# Patient Record
Sex: FEMALE | Race: WHITE
[De-identification: names, ages, dates, MRNs, and addresses within clinical notes are randomized per-mention and may not be internally consistent; named-entity substitution may affect disease eponyms.]

---

## 2017-08-08 ENCOUNTER — HOSPITAL ENCOUNTER (OUTPATIENT)
Dept: HOSPITAL 76 - DI.N | Age: 42
Discharge: HOME | End: 2017-08-08
Attending: PHYSICIAN ASSISTANT
Payer: COMMERCIAL

## 2017-08-08 DIAGNOSIS — Z12.31: Primary | ICD-10-CM

## 2017-08-08 PROCEDURE — 77067 SCR MAMMO BI INCL CAD: CPT

## 2017-08-11 NOTE — MAMMOGRAPHY REPORT
DIGITAL SCREENING MAMMOGRAM:  08/08/2017

 

CLINICAL INDICATION:  A 41-year-old for baseline.

 

TECHNIQUE:  Routine CC and MLO projections were obtained of the breasts as well as bilateral laterall
y exaggerated craniocaudal views.  

 

FINDINGS:  The breasts demonstrate heterogeneously dense fibroglandular parenchyma bilaterally.  Punc
marquez, typically benign calcifications are present.  No suspicious masses, clustered microcalcificatio
ns, or regions of architectural distortion are identified.  

 

IMPRESSION:  BENIGN FINDINGS.  

 

RECOMMENDATION:  Routine annual screening unless otherwise clinically indicated. 

 

BI-RADS category 2, benign findings. 

 

STANDARD QUALIFYING STATEMENTS

 

1. This examination was reviewed with the aid of Computer-Aided Detection (CAD).

 

2. A negative or benign imaging report should not delay biopsy if clinically suspicious findings are 
present. Consider surgical consultation if warranted. More than 5% of cancers are not identified by i
maging.

 

3. Dense breasts may obscure an underlying neoplasm.

 

 

 

DD:08/11/2017 14:56:08  DT: 08/11/2017 15:43  JOB #: [id number]  EXT JOB #:[id number]

## 2017-08-30 ENCOUNTER — HOSPITAL ENCOUNTER (OUTPATIENT)
Dept: HOSPITAL 76 - LAB.WCP | Age: 42
Discharge: HOME | End: 2017-08-30
Attending: PHYSICIAN ASSISTANT
Payer: COMMERCIAL

## 2017-08-30 DIAGNOSIS — R74.8: Primary | ICD-10-CM

## 2017-08-30 LAB
ALBUMIN/GLOB SERPL: 1.1 {RATIO} (ref 1–2.2)
ANION GAP SERPL CALCULATED.4IONS-SCNC: 6 MMOL/L (ref 6–13)
BILIRUB BLD-MCNC: 0.9 MG/DL (ref 0.2–1)
BUN SERPL-MCNC: 13 MG/DL (ref 6–20)
CALCIUM UR-MCNC: 9.1 MG/DL (ref 8.5–10.3)
CHLORIDE SERPL-SCNC: 108 MMOL/L (ref 101–111)
CO2 SERPL-SCNC: 23 MMOL/L (ref 21–32)
CREAT SERPLBLD-SCNC: 0.9 MG/DL (ref 0.4–1)
GFRSERPLBLD MDRD-ARVRAT: 69 ML/MIN/{1.73_M2} (ref 89–?)
GLOBULIN SER-MCNC: 3.6 G/DL (ref 2.1–4.2)
GLUCOSE SERPL-MCNC: 100 MG/DL (ref 70–100)
POTASSIUM SERPL-SCNC: 5.1 MMOL/L (ref 3.5–5)
PROT SPEC-MCNC: 7.7 G/DL (ref 6.7–8.2)
SODIUM SERPLBLD-SCNC: 137 MMOL/L (ref 135–145)

## 2017-08-30 PROCEDURE — 36415 COLL VENOUS BLD VENIPUNCTURE: CPT

## 2017-08-30 PROCEDURE — 80053 COMPREHEN METABOLIC PANEL: CPT

## 2017-09-02 ENCOUNTER — HOSPITAL ENCOUNTER (OUTPATIENT)
Dept: HOSPITAL 76 - DI | Age: 42
Discharge: HOME | End: 2017-09-02
Attending: PHYSICIAN ASSISTANT
Payer: COMMERCIAL

## 2017-09-02 DIAGNOSIS — R74.8: Primary | ICD-10-CM

## 2017-09-02 DIAGNOSIS — R16.0: ICD-10-CM

## 2017-09-02 PROCEDURE — 76705 ECHO EXAM OF ABDOMEN: CPT

## 2017-09-02 NOTE — ULTRASOUND REPORT
EXAM:

ABDOMEN ULTRASOUND LIMITED, RUQ

 

EXAM DATE: 9/2/2017 10:13 AM.

 

CLINICAL HISTORY: ELEVATED LIVER ENZYMES.

 

COMPARISON: None.

 

TECHNIQUE: Real-time scanning was performed with static images obtained. 

 

FINDINGS: 

Liver: Normal in size and echotexture. Mildly enlarged, right lobe length 19 cm, previously at least 
17 cm. Main portal vein flow: Hepatopetal.

 

Gallbladder: Normal. No stones, wall thickening, or sonographic Webb's sign. 

 

Biliary System: CBD measures 3 mm. No intrahepatic or extrahepatic ductal dilatation. 

 

Other: Right kidney and visualized pancreas, IVC and proximal aorta are unremarkable. No free fluid d
emonstrated. 

 

IMPRESSION: 

1. No acute abnormality. 

2. Mildly enlarged liver.

 

[facility]

Referring Provider Line: [phone]

 

SITE ID: [id number]

## 2019-08-25 ENCOUNTER — HOSPITAL ENCOUNTER (EMERGENCY)
Dept: HOSPITAL 76 - ED | Age: 44
Discharge: HOME | End: 2019-08-25
Payer: COMMERCIAL

## 2019-08-25 VITALS — SYSTOLIC BLOOD PRESSURE: 119 MMHG | DIASTOLIC BLOOD PRESSURE: 78 MMHG

## 2019-08-25 DIAGNOSIS — S61.210A: Primary | ICD-10-CM
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
## 2019-08-25 PROCEDURE — 99283 EMERGENCY DEPT VISIT LOW MDM: CPT

## 2019-08-25 PROCEDURE — 12001 RPR S/N/AX/GEN/TRNK 2.5CM/<: CPT

## 2019-08-25 NOTE — ED PHYSICIAN DOCUMENTATION
PD HPI UPPER EXT INJURY





- Stated complaint


Stated Complaint: FINGER LAC





- Chief complaint


Chief Complaint: Laceration





- History obtained from


History obtained from: Patient





- History of Present Illness


Location: Right, Finger (index)


Type of injury: Laceration


Where injury occurred: Work


Timing - onset: Today


Timing - duration: Minutes


Timing - details: Abrupt onset, Still present


Improved by: Rest, Immobilization


Worsened by: Moving, Palpating


Associated symptoms: No: Weakness, Numbness, Tingling, Swelling


Contributing factors: No: Anticoagulated


Similar symptoms before: Diagnosis (Laceration)


Recently seen: Not recently seen





- Additonal information


Additional information: 





44-year-old previously well female was cleaning a brand-new knife when she 

lacerated the top of her right index finger.





Review of Systems


Constitutional: denies: Fever


Eyes: denies: Decreased vision


Ears: denies: Ear pain


Nose: denies: Congestion


Throat: denies: Sore throat


Respiratory: denies: Dyspnea, Cough


GI: denies: Vomiting


: denies: Dysuria


Skin: reports: Laceration (s)





PD PAST MEDICAL HISTORY





- Past Surgical History


Past Surgical History: Yes


/GYN: Tubal ligation


HEENT: Tonsil/Adenoidectomy





- Present Medications


Home Medications: 


                                Ambulatory Orders











 Medication  Instructions  Recorded  Confirmed


 


No Known Home Medications  11/16/14 11/16/14














- Allergies


Allergies/Adverse Reactions: 


                                    Allergies











Allergy/AdvReac Type Severity Reaction Status Date / Time


 


codeine Allergy  Emesis Verified 11/16/14 13:02














- Social History


Does the pt smoke?: No


Smoking Status: Never smoker


Does the pt drink ETOH?: Yes


Does the pt have substance abuse?: No





- Immunizations


Immunizations are current?: No


Immunizations: TDAP >10years/unknown





PD ED PE NORMAL





- Vitals


Vital signs reviewed: Yes (Normal)





- General


General: Alert and oriented X 3, No acute distress, Well developed/nourished





- HEENT


HEENT: Atraumatic, PERRL





- Respiratory


Respiratory: No respiratory distress





- Derm


Derm: Normal color, Warm and dry, No rash





- Extremities


Extremities: No deformity, Normal ROM s pain, No edema, Other (There is a 2.5 cm

laceration over the dorsal surface of the right index finger to the radial side 

of the finger.  This is over the DIP and there is no involvement of the nail or 

the nail bed and the distal neurovascular components are intact.  She has full 

function of the finger.)





- Neuro


Neuro: Alert and oriented X 3, CNs 2-12 intact, No motor deficit, No sensory 

deficit, Normal speech


Eye Opening: Spontaneous


Motor: Obeys Commands


Verbal: Oriented


GCS Score: 15





- Psych


Psych: Normal mood, Normal affect





Results





- Vitals


Vitals: 





                               Vital Signs - 24 hr











  08/25/19





  21:51


 


Temperature 36.6 C


 


Heart Rate 65


 


Respiratory 14





Rate 


 


Blood Pressure 125/68


 


O2 Saturation 100








                                     Oxygen











O2 Source                      Room air

















Procedures





- Laceration (location)


  ** Right index


Length in cm: 2.5


Wound type: Linear, Clean


Neurovascular status: Sensory intact, Motor intact, Vascular intact


Anesthesia: Lidocaine 1%, With bicarb


Wound Preparation: Hibiclens, Irrigated copiously NS, Wound explored, To the 

base


Skin layer closure: Nylon, Interrupted, Size #-0 - enter number ([phone])


Other: Patient tolerated well, No complications, Neurovascular intact, Dressing 

applied, Tetanus UTD


Complexity: Simple





PD MEDICAL DECISION MAKING





- ED course


Complexity details: considered differential, d/w patient, d/w family


ED course: 





44-year-old female with a laceration to the right index finger is 

neurovascularly intact she is cleansed and anesthetized sutured and dressed.  

She is up-to-date on her tetanus.





Departure





- Departure


Disposition: 01 Home, Self Care


Clinical Impression: 


Laceration of right index finger


Qualifiers:


 Encounter type: initial encounter Damage to nail status: without damage Foreign

body presence: without foreign body Qualified Code(s): S61.210A - Laceration 

without foreign body of right index finger without damage to nail, initial 

encounter





Condition: Stable


Instructions:  ED Laceration Hand


Follow-Up: 


Shruthi Hahn PA-C [Primary Care Provider] - 


Comments: 


Sutures should be removed in 10 days.

## 2019-10-28 ENCOUNTER — HOSPITAL ENCOUNTER (OUTPATIENT)
Dept: HOSPITAL 76 - LAB.R | Age: 44
Discharge: HOME | End: 2019-10-28
Attending: FAMILY MEDICINE
Payer: COMMERCIAL

## 2019-10-28 DIAGNOSIS — N39.0: Primary | ICD-10-CM

## 2019-10-28 PROCEDURE — 87181 SC STD AGAR DILUTION PER AGT: CPT

## 2019-10-28 PROCEDURE — 87086 URINE CULTURE/COLONY COUNT: CPT
